# Patient Record
Sex: FEMALE | Race: BLACK OR AFRICAN AMERICAN | NOT HISPANIC OR LATINO | Employment: OTHER | URBAN - METROPOLITAN AREA
[De-identification: names, ages, dates, MRNs, and addresses within clinical notes are randomized per-mention and may not be internally consistent; named-entity substitution may affect disease eponyms.]

---

## 2019-01-22 PROBLEM — 428283002 HISTORY OF POLYP OF COLON (SITUATION): Status: ACTIVE | Noted: 2019-01-22

## 2019-01-22 PROBLEM — 449681000124101 LONG-TERM CURRENT USE OF ANTIPLATELET DRUG: Status: ACTIVE | Noted: 2019-01-22

## 2019-01-22 PROBLEM — 267024001 ABNORMAL WEIGHT LOSS: Status: ACTIVE | Noted: 2019-01-22

## 2020-12-03 ENCOUNTER — OFFICE VISIT (OUTPATIENT)
Dept: URBAN - METROPOLITAN AREA CLINIC 27 | Facility: CLINIC | Age: 80
End: 2020-12-03

## 2022-04-30 ENCOUNTER — TELEPHONE ENCOUNTER (OUTPATIENT)
Dept: URBAN - METROPOLITAN AREA CLINIC 121 | Facility: CLINIC | Age: 82
End: 2022-04-30

## 2022-04-30 RX ORDER — HYDROCORTISONE 25 MG/G
APPLY 1 A SMALL AMOUNT VIA APPLICATOR TWICE A DAY AS DIRECTED APPLY TO RECTUM INTERNAL AND  EXTERNAL WITH GLOVE FINGER BID PRN CREAM TOPICAL
OUTPATIENT
Start: 2021-09-01 | End: 2021-12-30

## 2022-04-30 RX ORDER — AMLODIPINE BESYLATE AND BENAZEPRIL HYDROCHLORIDE 5; 10 MG/1; MG/1
CAPSULE ORAL
OUTPATIENT
Start: 2010-03-18 | End: 2013-10-30

## 2022-04-30 RX ORDER — AMLODIPINE BESYLATE AND BENAZEPRIL HYDROCHLORIDE 5; 10 MG/1; MG/1
CAPSULE ORAL
OUTPATIENT
Start: 2010-03-18

## 2022-05-01 ENCOUNTER — TELEPHONE ENCOUNTER (OUTPATIENT)
Dept: URBAN - METROPOLITAN AREA CLINIC 121 | Facility: CLINIC | Age: 82
End: 2022-05-01

## 2022-05-01 RX ORDER — MULTIVIT-MIN/FA/LYCOPEN/LUTEIN .4-300-25
TABLET ORAL
Status: ACTIVE | COMMUNITY
Start: 2019-01-22

## 2022-05-01 RX ORDER — ACETAMINOPHEN/CHLORPHENIRAMINE 325MG-2MG
TABLET ORAL
Status: ACTIVE | COMMUNITY
Start: 2019-01-22

## 2022-05-01 RX ORDER — EZETIMIBE 10 MG/1
TABLET ORAL
Status: ACTIVE | COMMUNITY
Start: 2019-01-22

## 2022-05-01 RX ORDER — COLESEVELAM HYDROCHLORIDE 625 MG/1
TABLET, FILM COATED ORAL
Status: ACTIVE | COMMUNITY
Start: 2013-10-30

## 2022-05-01 RX ORDER — ASPIRIN 81 MG/1
TABLET, FILM COATED ORAL
Status: ACTIVE | COMMUNITY
Start: 2019-02-12

## 2022-05-01 RX ORDER — AMLODIPINE BESYLATE AND BENAZEPRIL HYDROCHLORIDE 5; 10 MG/1; MG/1
QD CAPSULE ORAL
Status: ACTIVE | COMMUNITY
Start: 2013-10-30

## 2024-04-17 ENCOUNTER — OV NP (OUTPATIENT)
Dept: URBAN - METROPOLITAN AREA CLINIC 27 | Facility: CLINIC | Age: 84
End: 2024-04-17
Payer: MEDICARE

## 2024-04-17 VITALS
HEART RATE: 79 BPM | SYSTOLIC BLOOD PRESSURE: 167 MMHG | WEIGHT: 110 LBS | HEIGHT: 64 IN | DIASTOLIC BLOOD PRESSURE: 119 MMHG | BODY MASS INDEX: 18.78 KG/M2 | RESPIRATION RATE: 17 BRPM

## 2024-04-17 DIAGNOSIS — Z86.010 HX OF COLONIC POLYPS: ICD-10-CM

## 2024-04-17 DIAGNOSIS — R41.3 MEMORY DEFICIT: ICD-10-CM

## 2024-04-17 DIAGNOSIS — R63.4 WEIGHT LOSS: ICD-10-CM

## 2024-04-17 DIAGNOSIS — I10 HYPERTENSION: ICD-10-CM

## 2024-04-17 DIAGNOSIS — R13.10 DYSPHAGIA: ICD-10-CM

## 2024-04-17 DIAGNOSIS — K59.09 CONSTIPATION: ICD-10-CM

## 2024-04-17 PROCEDURE — 99204 OFFICE O/P NEW MOD 45 MIN: CPT | Performed by: INTERNAL MEDICINE

## 2024-04-17 RX ORDER — AMLODIPINE BESYLATE AND BENAZEPRIL HYDROCHLORIDE 5; 10 MG/1; MG/1
QD CAPSULE ORAL
Status: ACTIVE | COMMUNITY
Start: 2013-10-30

## 2024-04-17 RX ORDER — ASPIRIN 81 MG/1
TABLET, FILM COATED ORAL
Status: ON HOLD | COMMUNITY
Start: 2019-02-12

## 2024-04-17 RX ORDER — EZETIMIBE 10 MG/1
TABLET ORAL
Status: ACTIVE | COMMUNITY
Start: 2019-01-22

## 2024-04-17 RX ORDER — MULTIVIT-MIN/FA/LYCOPEN/LUTEIN .4-300-25
TABLET ORAL
Status: ACTIVE | COMMUNITY
Start: 2019-01-22

## 2024-04-17 RX ORDER — COLESEVELAM HYDROCHLORIDE 625 MG/1
TABLET, FILM COATED ORAL
Status: ON HOLD | COMMUNITY
Start: 2013-10-30

## 2024-04-17 RX ORDER — ACETAMINOPHEN/CHLORPHENIRAMINE 325MG-2MG
TABLET ORAL
Status: ACTIVE | COMMUNITY
Start: 2019-01-22

## 2024-04-17 NOTE — HPI-TODAY'S VISIT:
Patient here for repeat colonoscopy.  Her last colonoscopy was in 2019; 1 small polyp was removed.  She seems to be doing fairly well from a GI standpoint at present.  She has occasional dysphagia to one of her OTC pills (which is large).  She does not have dysphagia to solids or liquids.  She has not tried finding an equivalent of this large pill.  She has occasional constipation for which she takes ?MOM.  She is not currently using MiraLAX.  She states that she has lost 10 pounds over the past 6 months though her weight is stable when compared to her weight at last check in this office in 2019.  Her appetite is normal. She has had mild/memory issues deficits recently.  She thinks that recent labs were normal.  There is no family history of colon cancer or polyps.  She is not currently drinking Boost or Ensure.

## 2024-04-19 ENCOUNTER — LAB (OUTPATIENT)
Dept: URBAN - METROPOLITAN AREA CLINIC 27 | Facility: CLINIC | Age: 84
End: 2024-04-19

## 2024-05-14 ENCOUNTER — OFFICE VISIT (OUTPATIENT)
Dept: URBAN - METROPOLITAN AREA SURGERY CENTER 7 | Facility: SURGERY CENTER | Age: 84
End: 2024-05-14